# Patient Record
Sex: MALE | Race: AMERICAN INDIAN OR ALASKA NATIVE
[De-identification: names, ages, dates, MRNs, and addresses within clinical notes are randomized per-mention and may not be internally consistent; named-entity substitution may affect disease eponyms.]

---

## 2021-11-20 ENCOUNTER — HOSPITAL ENCOUNTER (EMERGENCY)
Dept: HOSPITAL 5 - ED | Age: 26
Discharge: HOME | End: 2021-11-20
Payer: SELF-PAY

## 2021-11-20 VITALS — SYSTOLIC BLOOD PRESSURE: 123 MMHG | DIASTOLIC BLOOD PRESSURE: 64 MMHG

## 2021-11-20 DIAGNOSIS — J45.909: ICD-10-CM

## 2021-11-20 DIAGNOSIS — Z79.899: ICD-10-CM

## 2021-11-20 DIAGNOSIS — J20.9: Primary | ICD-10-CM

## 2021-11-20 LAB
BASOPHILS # (AUTO): 0 K/MM3 (ref 0–0.1)
BASOPHILS NFR BLD AUTO: 0.2 % (ref 0–1.8)
BUN SERPL-MCNC: 13 MG/DL (ref 9–20)
BUN/CREAT SERPL: 14 %
CALCIUM SERPL-MCNC: 9.2 MG/DL (ref 8.4–10.2)
EOSINOPHIL # BLD AUTO: 0 K/MM3 (ref 0–0.4)
EOSINOPHIL NFR BLD AUTO: 0.3 % (ref 0–4.3)
HCT VFR BLD CALC: 51 % (ref 35.5–45.6)
HEMOLYSIS INDEX: 9
HGB BLD-MCNC: 16.8 GM/DL (ref 11.8–15.2)
LYMPHOCYTES # BLD AUTO: 1.1 K/MM3 (ref 1.2–5.4)
LYMPHOCYTES NFR BLD AUTO: 16.9 % (ref 13.4–35)
MCHC RBC AUTO-ENTMCNC: 33 % (ref 32–34)
MCV RBC AUTO: 96 FL (ref 84–94)
MONOCYTES # (AUTO): 0.3 K/MM3 (ref 0–0.8)
MONOCYTES % (AUTO): 4.5 % (ref 0–7.3)
PLATELET # BLD: 205 K/MM3 (ref 140–440)
RBC # BLD AUTO: 5.3 M/MM3 (ref 3.65–5.03)

## 2021-11-20 PROCEDURE — 96365 THER/PROPH/DIAG IV INF INIT: CPT

## 2021-11-20 PROCEDURE — 36415 COLL VENOUS BLD VENIPUNCTURE: CPT

## 2021-11-20 PROCEDURE — 85025 COMPLETE CBC W/AUTO DIFF WBC: CPT

## 2021-11-20 PROCEDURE — 99285 EMERGENCY DEPT VISIT HI MDM: CPT

## 2021-11-20 PROCEDURE — 80048 BASIC METABOLIC PNL TOTAL CA: CPT

## 2021-11-20 PROCEDURE — 96375 TX/PRO/DX INJ NEW DRUG ADDON: CPT

## 2021-11-20 PROCEDURE — 71045 X-RAY EXAM CHEST 1 VIEW: CPT

## 2021-11-20 PROCEDURE — 71275 CT ANGIOGRAPHY CHEST: CPT

## 2021-11-20 PROCEDURE — 94644 CONT INHLJ TX 1ST HOUR: CPT

## 2021-11-20 PROCEDURE — 94640 AIRWAY INHALATION TREATMENT: CPT

## 2021-11-20 RX ADMIN — IPRATROPIUM BROMIDE ONE MG: 0.5 SOLUTION RESPIRATORY (INHALATION) at 09:21

## 2021-11-20 RX ADMIN — IPRATROPIUM BROMIDE ONE MG: 0.5 SOLUTION RESPIRATORY (INHALATION) at 09:19

## 2021-11-20 NOTE — EMERGENCY DEPARTMENT REPORT
ED Shortness of Breath HPI





- General


Chief Complaint: Dyspnea/Respdistress


Stated Complaint: sob


Time Seen by Provider: 11/20/21 08:47


Source: patient, EMS


Mode of arrival: Stretcher


Limitations: No Limitations





- History of Present Illness


Initial Comments: 





CC: shortness of breath





HPI:  This is a 25 yo male without signifcant past medical history who presents 

with shortness of breath and chest tightness.  For 2 months, he has apparent 

shortness of breath with productive cough.  Sputum is colored.  He has had 

wheezing.  He had exposure.  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  Recently a person 

spit in his face.  He is concerned for infectious exposure.  Today after running

2 miles at work, he developed shortness of breath and chest tightness.  EMS 

transported patient.  Room air saturation 99%.





No history of asthma.  Does have occasional itchy eyes.


MD Complaint: shortness of breath, cough


-: Sudden, This morning


Severity: severe


Consistency: constant


Improves With: oxygen


Context: recent URI, other (2 months of productive cough)





- Related Data


                                  Previous Rx's











 Medication  Instructions  Recorded  Last Taken  Type


 


Albuterol Mdi (or & Nicu Only) 2 puff IH QID PRN #8.5 gram 11/20/21 Unknown Rx





[ProAir HFA Inhaler]    


 


Doxycycline Hyclate 100 mg PO BID 7 Days #14 capsule 11/20/21 Unknown Rx


 


Prednisone [predniSONE 10 mg 10 mg PO .TAPER #1 tab.ds.pk 11/20/21 Unknown Rx





(6-Day Pack, 21 Tabs)]    











                                    Allergies











Allergy/AdvReac Type Severity Reaction Status Date / Time


 


No Known Allergies Allergy   Verified 11/20/21 08:53














ED Review of Systems


ROS: 


Stated complaint: sob


Other details as noted in HPI





Comment: All other systems reviewed and negative


Constitutional: denies: chills, fever


Respiratory: cough, shortness of breath, wheezing


Cardiovascular: chest pain


Gastrointestinal: denies: abdominal pain, nausea, vomiting





ED Past Medical Hx





- Past Medical History


Previous Medical History?: No





- Surgical History


Past Surgical History?: No





- Social History


Smoking Status: Never Smoker


Substance Use Type: None





- Medications


Home Medications: 


                                Home Medications











 Medication  Instructions  Recorded  Confirmed  Last Taken  Type


 


Albuterol Mdi (or & Nicu Only) 2 puff IH QID PRN #8.5 gram 11/20/21  Unknown Rx





[ProAir HFA Inhaler]     


 


Doxycycline Hyclate 100 mg PO BID 7 Days #14 capsule 11/20/21  Unknown Rx


 


Prednisone [predniSONE 10 mg 10 mg PO .TAPER #1 tab.ds.pk 11/20/21  Unknown Rx





(6-Day Pack, 21 Tabs)]     














ED Physical Exam





- General


Limitations: No Limitations


General appearance: alert, other (Frequent coughing, audible wheezing)





- Head


Head exam: Present: atraumatic, normocephalic





- Eye


Eye exam: Present: normal appearance





- ENT


ENT exam: Present: mucous membranes moist





- Neck


Neck exam: Present: normal inspection, full ROM





- Respiratory


Respiratory exam: Present: wheezes, other (Rapid breathing, 32 breaths/min, 

frequent cough, loud expiratory wheezing).  Absent: rales, rhonchi, accessory 

muscle use, decreased breath sounds, prolonged expiratory





- Cardiovascular


Cardiovascular Exam: Present: regular rate, normal rhythm, normal heart sounds. 

Absent: systolic murmur, diastolic murmur, rubs, gallop





- GI/Abdominal


GI/Abdominal exam: Present: soft, normal bowel sounds.  Absent: distended, 

tenderness, guarding, rebound





- Rectal


Rectal exam: Present: deferred





- Extremities Exam


Extremities exam: Present: normal inspection





- Neurological Exam


Neurological exam: Present: alert, oriented X3





- Psychiatric


Psychiatric exam: Present: normal affect, normal mood





- Skin


Skin exam: Present: warm, dry, intact, normal color.  Absent: rash





ED Course


                                   Vital Signs











  11/20/21 11/20/21 11/20/21





  08:48 08:49 09:02


 


Temperature  98.9 F 


 


Pulse Rate 100 H 96 H 105 H


 


Pulse Rate [   





Bilateral   





Throughout]   


 


Respiratory 16 13 15





Rate   


 


Respiratory   





Rate [Bilateral   





Throughout]   


 


Blood Pressure  127/86 


 


O2 Sat by Pulse 99 100 100





Oximetry   














  11/20/21 11/20/21 11/20/21





  09:15 09:24 09:31


 


Temperature   


 


Pulse Rate 94 H  112 H


 


Pulse Rate [  103 H 





Bilateral   





Throughout]   


 


Respiratory 13  13





Rate   


 


Respiratory  18 





Rate [Bilateral   





Throughout]   


 


Blood Pressure 121/89  128/79


 


O2 Sat by Pulse 99  100





Oximetry   














  11/20/21 11/20/21 11/20/21





  09:45 10:01 10:15


 


Temperature   


 


Pulse Rate 105 H 113 H 115 H


 


Pulse Rate [   





Bilateral   





Throughout]   


 


Respiratory 14 21 13





Rate   


 


Respiratory   





Rate [Bilateral   





Throughout]   


 


Blood Pressure 130/80 140/83 133/89


 


O2 Sat by Pulse 100 100 99





Oximetry   














  11/20/21 11/20/21 11/20/21





  10:31 10:45 11:01


 


Temperature   


 


Pulse Rate 97 H 98 H 78


 


Pulse Rate [   





Bilateral   





Throughout]   


 


Respiratory 12 12 20





Rate   


 


Respiratory   





Rate [Bilateral   





Throughout]   


 


Blood Pressure 127/84 124/68 113/59


 


O2 Sat by Pulse 100 98 99





Oximetry   














  11/20/21 11/20/21 11/20/21





  11:15 11:37 11:45


 


Temperature   


 


Pulse Rate 78 88 86


 


Pulse Rate [   





Bilateral   





Throughout]   


 


Respiratory 20 12 11 L





Rate   


 


Respiratory   





Rate [Bilateral   





Throughout]   


 


Blood Pressure 104/57 113/59 109/59


 


O2 Sat by Pulse 100 99 99





Oximetry   














  11/20/21 11/20/21 11/20/21





  12:00 12:19 12:31


 


Temperature   


 


Pulse Rate 101 H 103 H 95 H


 


Pulse Rate [   





Bilateral   





Throughout]   


 


Respiratory 14 23 14





Rate   


 


Respiratory   





Rate [Bilateral   





Throughout]   


 


Blood Pressure 129/79 129/79 109/58


 


O2 Sat by Pulse 99 97 99





Oximetry   














  11/20/21





  13:17


 


Temperature 


 


Pulse Rate 


 


Pulse Rate [ 14 L





Bilateral 





Throughout] 


 


Respiratory 





Rate 


 


Respiratory 92 H





Rate [Bilateral 





Throughout] 


 


Blood Pressure 


 


O2 Sat by Pulse 





Oximetry 














- Reevaluation(s)


Reevaluation #1: 





11/20/21 10:00


Persistent expiratory wheezing after DuoNeb





ED Medical Decision Making





- Lab Data


Result diagrams: 


                                 11/20/21 10:08





                                 11/20/21 10:08





- Radiology Data


Radiology results: report reviewed





Patient Name: BRAD RODRIGEZ JR


Patient ID: [id number]


Gender: Male


YOB: 1995


Home Phone: [phone]


Referring Provider: SYEDA CHILDERS


Organization: [facility]


Accession Number: [id number]


Requested Date: November 20, 2021 11:37


Report Status: Final


Requested Procedure: 1


Procedure Description: CT angio chest


Modality: CT


Findings


Reporting MD: Olman Shirley


Dictation Time: November 20, 2021 11:49


: Not available


Transcription Date:


CTA chest with contrast 


INDICATION : abnormal chest radiograph wheezing FELIX COUGH OMNIPAQUE 350 100ML. 


TECHNIQUE: Axial imaging performed through the chest, with contrast bolus timing

set to maximize opacification of the pulmonary


arteries. 3-plane MIP reformatted images were obtained. All CT scans at this 

location are performed using CT dose reduction for


ALARA by means of automated exposure control. 


100 mL of intravenous contrast administered. 


COMPARISON: Chest x-ray from today 


FINDINGS: 


Bolus/PTE: Contrast bolus timing is adequate. No filling defect is present to 

suggest PTE. 


Mediastinum: Heart and great vessels appear normal. No pathologic mediastinal 

adenopathy. 


Lungs: Lungs are clear. 


Upper abdomen: Limited imaging of the upper abdomen shows nothing acute. 


Bones: Degenerative changes in the spine with nothing acute. 


IMPRESSION: Negative for PTE. Clear lungs. 


Signer Name: Olman Shirley MD 


Signed: 11/20/2021 11:49 AM 


Workstation Name: [id number]





Patient Name: BRAD RODRIGEZ JR


Patient ID: [id number]


Gender: Male


YOB: 1995


Home Phone: [phone]


Referring Provider: SYEDA CHILDERS


Organization: [facility]


Accession Number: [id number]


Requested Date: November 20, 2021 09:59


Report Status: Final


Requested Procedure: 1


Procedure Description: XR chest 1V ap


Modality: XR


Findings


Reporting MD: Olman Shirley


Dictation Time: November 20, 2021 09:32


: Not available


Transcription Date:


CHEST 1 VIEW 


INDICATION: 


dyspnea. 


COMPARISON: 


None 


FINDINGS: 


SUPPORT DEVICES: None. 


HEART: Within normal limits. 


LUNGS/PLEURA: Suboptimal inspiratory effort with minimal streaky left basilar 

airspace disease. Otherwise clear lungs. There is


suggestion of mediastinal adenopathy with bilateral hilar and right paratracheal

soft tissue prominence. Consider follow-up CT chest


with contrast for further evaluation. 


ADDITIONAL FINDINGS: None. 


IMPRESSION: 


1. Lung findings as above. 


Signer Name: Olman Shirley MD 


Signed: 11/20/2021 9:32 AM 


Workstation Name: [id number]





- Medical Decision Making





Clinical impression: Allergic bronchospasm, patient had persistent wheezing.  

Wheezing did resolve with bronchodilator therapy, patient also received 

prednisone magnesium.  No history of 2 months of wheezing, bacterial bronchitis 

is a consideration.  With this duration antibiotics indicated.  Patient received

prescription for doxycycline prednisone albuterol MDI.  Instructed to follow-up 

with his primary care physician.  Also referred to pulmonologist.  CBC chemistry

unremarkable chest radiograph with minimal streaky left basilar airspace CT 

chest clear lung normal exam.


Critical care attestation.: 


If time is entered above; I have spent that time in minutes in the direct care 

of this critically ill patient, excluding procedure time.








ED Disposition


Clinical Impression: 


 Acute bronchitis, Reactive airway disease with wheezing





Disposition: 01 HOME / SELF CARE / HOMELESS


Is pt being admited?: No


Does the pt Need Aspirin: No


Condition: Stable


Instructions:  Acute Bronchitis (ED), Acute Bronchitis, Adult, Easy-to-Read


Prescriptions: 


Doxycycline Hyclate 100 mg PO BID 7 Days #14 capsule


Prednisone [predniSONE 10 mg (6-Day Pack, 21 Tabs)] 10 mg PO .TAPER #1 tab.ds.pk


Albuterol Mdi (or & Nicu Only) [ProAir HFA Inhaler] 2 puff IH QID PRN #8.5 gram


 PRN Reason: Shortness Of Breath


Referrals: 


BRENNON COATS MD [Staff Physician] - 3-5 Days

## 2021-11-20 NOTE — XRAY REPORT
CHEST 1 VIEW 



INDICATION: 

dyspnea.



COMPARISON: 

None



FINDINGS:



SUPPORT DEVICES: None.



HEART: Within normal limits. 



LUNGS/PLEURA: Suboptimal inspiratory effort with minimal streaky left basilar airspace disease. Other
wise clear lungs. There is suggestion of mediastinal adenopathy with bilateral hilar and right paratr
acheal soft tissue prominence. Consider follow-up CT chest with contrast for further evaluation. 



ADDITIONAL FINDINGS: None.







IMPRESSION:

1. Lung findings as above.



Signer Name: Olman Shirley MD 

Signed: 11/20/2021 10:32 AM

Workstation Name: [id number]

## 2021-11-20 NOTE — CAT SCAN REPORT
CTA chest with contrast



INDICATION : abnormal chest radiograph wheezing   FELIX  COUGH    OMNIPAQUE 350 100ML.



TECHNIQUE:  Axial imaging performed through the chest, with contrast bolus timing set to maximize opa
cification of the pulmonary arteries. 3-plane MIP reformatted images were obtained.  All CT scans at 
this location are performed using CT dose reduction for ALARA by means of automated exposure control.
 



100 mL of intravenous contrast administered.



COMPARISON:  Chest x-ray from today



FINDINGS:  



Bolus/PTE:  Contrast bolus timing is adequate.  No filling defect is present to suggest PTE.



Mediastinum:  Heart and great vessels appear normal.  No pathologic mediastinal adenopathy.



Lungs:  Lungs are clear.



Upper abdomen:  Limited imaging of the upper abdomen shows nothing acute.



Bones:  Degenerative changes in the spine with nothing acute.





IMPRESSION: Negative for PTE.  Clear lungs.

 



Signer Name: Olman Shirley MD 

Signed: 11/20/2021 12:49 PM

Workstation Name: [id number]

## 2022-06-08 ENCOUNTER — HOSPITAL ENCOUNTER (EMERGENCY)
Dept: HOSPITAL 5 - ED | Age: 27
Discharge: HOME | End: 2022-06-08
Payer: COMMERCIAL

## 2022-06-08 DIAGNOSIS — Z77.21: Primary | ICD-10-CM

## 2022-06-08 LAB
ALBUMIN SERPL-MCNC: 4.7 G/DL (ref 3.9–5)
ALT SERPL-CCNC: 18 UNITS/L (ref 7–56)
BUN SERPL-MCNC: 15 MG/DL (ref 9–20)
BUN/CREAT SERPL: 15 %
CALCIUM SERPL-MCNC: 9.2 MG/DL (ref 8.4–10.2)
HEMOLYSIS INDEX: 10

## 2022-06-08 PROCEDURE — 36415 COLL VENOUS BLD VENIPUNCTURE: CPT

## 2022-06-08 PROCEDURE — 80053 COMPREHEN METABOLIC PANEL: CPT

## 2022-06-08 PROCEDURE — 87806 HIV AG W/HIV1&2 ANTB W/OPTIC: CPT

## 2022-06-08 PROCEDURE — 99283 EMERGENCY DEPT VISIT LOW MDM: CPT

## 2022-06-08 PROCEDURE — 86705 HEP B CORE ANTIBODY IGM: CPT

## 2022-06-08 PROCEDURE — 86706 HEP B SURFACE ANTIBODY: CPT

## 2022-06-08 PROCEDURE — 86803 HEPATITIS C AB TEST: CPT

## 2022-06-08 NOTE — EMERGENCY DEPARTMENT REPORT
ED General Adult HPI





- General


Chief complaint: Medical Clearance


Stated complaint: CPR/MOUTH TO MOUTH EXPOSURE


Time Seen by Provider: 06/08/22 18:45


Source: patient


Mode of arrival: Ambulatory


Limitations: No Limitations





- History of Present Illness


Initial comments: 





Patient is a 27-year-old male who is a  at [employer].  Patient 

was responding to a call when he performed 1 time mouth-to-mouth CPR on a person

who had overdosed on heroin.  He denies any other exposure.  He has no 

additional complaints.





- Related Data


                                  Previous Rx's











 Medication  Instructions  Recorded  Last Taken  Type


 


Albuterol Mdi (or & Nicu Only) 2 puff IH QID PRN #8.5 gram 11/20/21 Unknown Rx





[ProAir HFA Inhaler]    


 


Doxycycline Hyclate 100 mg PO BID 7 Days #14 capsule 11/20/21 Unknown Rx


 


Prednisone [predniSONE 10 mg 10 mg PO .TAPER #1 tab.ds.pk 11/20/21 Unknown Rx





(6-Day Pack, 21 Tabs)]    


 


Dolutegravir [Tivicay] 50 mg PO DAILY 28 Days #28 tab 06/08/22 Unknown Rx


 


Emtricitabine/Tenofovir (Tdf) 1 each PO DAILY 28 Days #28 tab 06/08/22 Unknown 

Rx





[Emtricitabine-Tenofv 200-300Mg]    











                                    Allergies











Allergy/AdvReac Type Severity Reaction Status Date / Time


 


No Known Allergies Allergy   Verified 06/08/22 17:55














ED Review of Systems


ROS: 


Stated complaint: CPR/MOUTH TO MOUTH EXPOSURE


Other details as noted in HPI





Constitutional: denies: chills, fever


Eyes: denies: eye pain, eye discharge, vision change


ENT: denies: ear pain, throat pain


Respiratory: denies: cough, shortness of breath, wheezing


Cardiovascular: denies: chest pain, palpitations


Endocrine: no symptoms reported


Gastrointestinal: denies: abdominal pain, nausea, diarrhea


Genitourinary: denies: urgency, dysuria


Musculoskeletal: denies: back pain, joint swelling, arthralgia


Skin: denies: rash, lesions


Neurological: denies: headache, weakness, paresthesias


Psychiatric: denies: anxiety, depression


Hematological/Lymphatic: denies: easy bleeding, easy bruising





ED Past Medical Hx





- Past Medical History


Previous Medical History?: No





- Surgical History


Past Surgical History?: No





- Social History


Smoking Status: Never Smoker





- Medications


Home Medications: 


                                Home Medications











 Medication  Instructions  Recorded  Confirmed  Last Taken  Type


 


Albuterol Mdi (or & Nicu Only) 2 puff IH QID PRN #8.5 gram 11/20/21  Unknown Rx





[ProAir HFA Inhaler]     


 


Doxycycline Hyclate 100 mg PO BID 7 Days #14 capsule 11/20/21  Unknown Rx


 


Prednisone [predniSONE 10 mg 10 mg PO .TAPER #1 tab.ds.pk 11/20/21  Unknown Rx





(6-Day Pack, 21 Tabs)]     


 


Dolutegravir [Tivicay] 50 mg PO DAILY 28 Days #28 tab 06/08/22  Unknown Rx


 


Emtricitabine/Tenofovir (Tdf) 1 each PO DAILY 28 Days #28 tab 06/08/22  Unknown 

Rx





[Emtricitabine-Tenofv 200-300Mg]     














ED Physical Exam





- General


Limitations: No Limitations


General appearance: alert, in no apparent distress





- Head


Head exam: Present: atraumatic, normocephalic





- Eye


Eye exam: Present: normal appearance





- ENT


ENT exam: Present: mucous membranes moist





- Neck


Neck exam: Present: normal inspection





- Respiratory


Respiratory exam: Present: normal lung sounds bilaterally.  Absent: respiratory 

distress





- Cardiovascular


Cardiovascular Exam: Present: regular rate, normal rhythm.  Absent: systolic 

murmur, diastolic murmur, rubs, gallop





- GI/Abdominal


GI/Abdominal exam: Present: soft, normal bowel sounds





- Rectal


Rectal exam: Present: deferred





- Extremities Exam


Extremities exam: Present: normal inspection





- Back Exam


Back exam: Present: normal inspection





- Neurological Exam


Neurological exam: Present: alert, oriented X3





- Psychiatric


Psychiatric exam: Present: normal affect, normal mood





- Skin


Skin exam: Present: warm, dry, intact, normal color.  Absent: rash





ED Course


                                   Vital Signs











  06/08/22





  18:11


 


O2 Sat by Pulse 100





Oximetry 














- Reevaluation(s)


Reevaluation #1: 





06/08/22 19:03


Patient's phone number is [phone].





ED Medical Decision Making





- Lab Data


Result diagrams: 


                                 06/08/22 18:49





- Medical Decision Making





This is a 27-year-old male here with complaints of body fluid exposure.  Patient

performed mouth-to-mouth CPR on a person that had overdosed on heroin.  I plan 

to obtain basic labs including HIV hep C and hep B testing.  The source patient 

has also consented to testing.


Critical care attestation.: 


If time is entered above; I have spent that time in minutes in the direct care 

of this critically ill patient, excluding procedure time.








ED Disposition


Clinical Impression: 


 Exposure to body fluid





Is pt being admited?: No


Does the pt Need Aspirin: No


Condition: Stable


Prescriptions: 


Emtricitabine/Tenofovir (Tdf) [Emtricitabine-Tenofv 200-300Mg] 1 each PO DAILY 

28 Days #28 tab


Dolutegravir [Tivicay] 50 mg PO DAILY 28 Days #28 tab